# Patient Record
Sex: FEMALE | Race: ASIAN | NOT HISPANIC OR LATINO | Employment: UNEMPLOYED | URBAN - METROPOLITAN AREA
[De-identification: names, ages, dates, MRNs, and addresses within clinical notes are randomized per-mention and may not be internally consistent; named-entity substitution may affect disease eponyms.]

---

## 2018-12-13 ENCOUNTER — HOSPITAL ENCOUNTER (EMERGENCY)
Facility: MEDICAL CENTER | Age: 38
End: 2018-12-14
Attending: EMERGENCY MEDICINE
Payer: COMMERCIAL

## 2018-12-13 DIAGNOSIS — M54.2 NECK PAIN: ICD-10-CM

## 2018-12-13 PROCEDURE — 99285 EMERGENCY DEPT VISIT HI MDM: CPT

## 2018-12-13 ASSESSMENT — PAIN SCALES - GENERAL: PAINLEVEL_OUTOF10: 9

## 2018-12-14 ENCOUNTER — TELEPHONE (OUTPATIENT)
Dept: SCHEDULING | Facility: IMAGING CENTER | Age: 38
End: 2018-12-14

## 2018-12-14 ENCOUNTER — APPOINTMENT (OUTPATIENT)
Dept: RADIOLOGY | Facility: MEDICAL CENTER | Age: 38
End: 2018-12-14
Attending: EMERGENCY MEDICINE
Payer: COMMERCIAL

## 2018-12-14 VITALS
HEART RATE: 88 BPM | WEIGHT: 108 LBS | OXYGEN SATURATION: 94 % | RESPIRATION RATE: 16 BRPM | BODY MASS INDEX: 17.99 KG/M2 | TEMPERATURE: 97.7 F | HEIGHT: 65 IN

## 2018-12-14 PROCEDURE — 96375 TX/PRO/DX INJ NEW DRUG ADDON: CPT

## 2018-12-14 PROCEDURE — 96376 TX/PRO/DX INJ SAME DRUG ADON: CPT

## 2018-12-14 PROCEDURE — 96374 THER/PROPH/DIAG INJ IV PUSH: CPT

## 2018-12-14 PROCEDURE — 700111 HCHG RX REV CODE 636 W/ 250 OVERRIDE (IP): Performed by: EMERGENCY MEDICINE

## 2018-12-14 PROCEDURE — 72040 X-RAY EXAM NECK SPINE 2-3 VW: CPT

## 2018-12-14 PROCEDURE — 36415 COLL VENOUS BLD VENIPUNCTURE: CPT

## 2018-12-14 RX ORDER — ONDANSETRON 2 MG/ML
4 INJECTION INTRAMUSCULAR; INTRAVENOUS ONCE
Status: COMPLETED | OUTPATIENT
Start: 2018-12-14 | End: 2018-12-14

## 2018-12-14 RX ORDER — LANSOPRAZOLE 30 MG/1
30 CAPSULE, DELAYED RELEASE ORAL DAILY
COMMUNITY

## 2018-12-14 RX ORDER — LORAZEPAM 1 MG/1
1 TABLET ORAL EVERY 4 HOURS PRN
COMMUNITY
End: 2018-12-17

## 2018-12-14 RX ORDER — HYDROMORPHONE HYDROCHLORIDE 1 MG/ML
0.5 INJECTION, SOLUTION INTRAMUSCULAR; INTRAVENOUS; SUBCUTANEOUS ONCE
Status: COMPLETED | OUTPATIENT
Start: 2018-12-14 | End: 2018-12-14

## 2018-12-14 RX ORDER — HYDROXYCHLOROQUINE SULFATE 200 MG/1
200 TABLET, FILM COATED ORAL DAILY
COMMUNITY

## 2018-12-14 RX ORDER — AMITRIPTYLINE HYDROCHLORIDE 10 MG/1
10 TABLET, FILM COATED ORAL NIGHTLY
COMMUNITY
End: 2018-12-17

## 2018-12-14 RX ORDER — LORAZEPAM 2 MG/ML
0.5 INJECTION INTRAMUSCULAR ONCE
Status: COMPLETED | OUTPATIENT
Start: 2018-12-14 | End: 2018-12-14

## 2018-12-14 RX ORDER — METOCLOPRAMIDE 10 MG/1
10 TABLET ORAL 4 TIMES DAILY
COMMUNITY
End: 2019-02-06 | Stop reason: SDUPTHER

## 2018-12-14 RX ADMIN — ONDANSETRON 4 MG: 2 INJECTION INTRAMUSCULAR; INTRAVENOUS at 01:34

## 2018-12-14 RX ADMIN — HYDROMORPHONE HYDROCHLORIDE 0.5 MG: 1 INJECTION, SOLUTION INTRAMUSCULAR; INTRAVENOUS; SUBCUTANEOUS at 00:48

## 2018-12-14 RX ADMIN — LORAZEPAM 0.5 MG: 2 INJECTION INTRAMUSCULAR at 00:47

## 2018-12-14 ASSESSMENT — LIFESTYLE VARIABLES: DO YOU DRINK ALCOHOL: NO

## 2018-12-14 ASSESSMENT — PAIN SCALES - GENERAL: PAINLEVEL_OUTOF10: 9

## 2018-12-14 NOTE — ED TRIAGE NOTES
Chief Complaint   Patient presents with   • Neck Pain   • GLF     Pt bib remsa for GLF from bottom step of stairway at home.  Pt denies LOC, vision changes or dizziness prior to fall.  Pt states pain back of neck.  Pt states numbness bilateral feet after fall, however symptoms have resolved.  Pt states hx of mixed connective tissue disease and bulging disk on right side.  Pt given 200 fentanyl, .5 mg versed and 4 mg zofran in transport.  Pt presents to ED in cervical collar.

## 2018-12-14 NOTE — ED PROVIDER NOTES
"ED Provider Note    CHIEF COMPLAINT  Chief Complaint   Patient presents with   • Neck Pain   • GLF       HPI  Fuentes Sherman is a 38 y.o. female who presents For evaluation after slipping down stairs at home.  She states she was coming down carpeted stairs, of which there are approximately 17, when her feet slipped out and she slid down a few of them on her bottom.  She notes that she was going downstairs to get her nightly edibles due to her insomnia from fibromyalgia.  She states she reached out for the railing and somehow \"twisted my neck\" at which point she heard a \"pop\" and had an immediate increase in the pain on the posterior lateral portion of her right neck.  Patient notes she deals with chronic pain but has only recently moved to [place] last Sunday and has no resources here.  She notes she has a bulging disc on the right and chronic radiculopathy into her right arm consisting of tingling.    REVIEW OF SYSTEMS  Constitutional: No fevers or chills  Skin: No rashes, abrasions, lacerations, or bruising  HEENT: No ear pain, ringing in ears, or decreased hearing. No sore throat, runny nose, sores, trouble swallowing, trouble speaking.  Neck: Acute on chronic neck pain, right-sided worse than left.  Chronic stiffness.  No masses  Chest: No pain or rashes  Pulm: No shortness of breath, cough, wheezing, stridor, or pain with inspiration/expiration  Gastrointestinal: No nausea, vomiting, diarrhea, constipation, bloating, melena, hematochezia or pain.  Genitourinary: No dysuria or hematuria  Musculoskeletal: No weakness, swelling, or deformities.  Patient notes she hurts \"everywhere\"  Neurologic: No motor changes. No confusion or disorientation.  Heme: No bleeding or bruising problems.   Immuno: No hx of recurrent infections      PAST MEDICAL HISTORY   has a past medical history of Mixed connective tissue disease (HCC).    SOCIAL HISTORY  Social History     Social History Main Topics   • Smoking status: Never Smoker   • " "Smokeless tobacco: Never Used   • Alcohol use Not on file   • Drug use: Yes     Types: Oral      Comment: marijuana   • Sexual activity: Not on file       SURGICAL HISTORY  patient denies any surgical history    CURRENT MEDICATIONS  Home Medications     Reviewed by Conor Salazar R.N. (Registered Nurse) on 12/14/18 at 0000  Med List Status: Not Addressed   Medication Last Dose Status   amitriptyline (ELAVIL) 10 MG Tab  Active   hydroxychloroquine (PLAQUENIL) 200 MG Tab  Active   lansoprazole (PREVACID) 30 MG CAPSULE DELAYED RELEASE  Active   LORazepam (ATIVAN) 1 MG Tab  Active   metoclopramide (REGLAN) 10 MG Tab  Active                ALLERGIES  Allergies   Allergen Reactions   • Ephedrine    • Morphine      Vomiting         PHYSICAL EXAM  VITAL SIGNS: Pulse 88   Temp 36.5 °C (97.7 °F) (Temporal)   Resp 16   Ht 1.651 m (5' 5\")   Wt 49 kg (108 lb)   SpO2 94%   BMI 17.97 kg/m²    Gen: Alert, lying in semi-pyle's position c-collar in place.  Patient tearful but otherwise cooperative  HEENT: No signs of trauma, Bilateral external ears normal, Nose normal. Conjunctiva normal, Non-icteric.   Neck: Diffuse neck tenderness, both anterior, posteriorly, and laterally.  She has no palpable masses, spinous process step-offs or deformities, and neck is otherwise supple.  C-collar replaced after exam.  Patient had was stabilized and not move throughout this exam.  Lymphatic: No cervical lymphadenopathy noted.   Cardiovascular: Regular rate and rhythm, no murmurs.   Thorax & Lungs: Normal breath sounds, No respiratory distress, No wheezing bilateral chest rise  Abdomen: Bowel sounds normal, Soft, No tenderness, No masses, No pulsatile masses. No Guarding or rebound  Skin: Warm, Dry, No erythema, No rash.   Back: No bony tenderness, No CVA tenderness.   Extremities: Intact distal pulses, No edema.   Neurologic: Alert , no facial droop, grossly normal coordination and strength.  Sensation intact to light touch to all " extremities.  Psychiatric: Affect normal, Judgment normal, Mood normal.       INITIAL IMPRESSION  Patient arrives for evaluation after minor trauma appears to have caused an acute exacerbation in her chronic pain.  Her chronic pain is extensive but centers around a radiculopathy involving the cervical spine and the right arm.  She has no new findings to suggest any significant muscular weakness and has no external findings to suggest significant trauma.  She is very anxious and will be given a dose of Ativan as well as a small dose of Dilaudid as she states this is the only thing that helps her.    LABS  No results found for this or any previous visit.    RADIOLOGY  DX-CERVICAL SPINE-2 OR 3 VIEWS   Final Result      No acute fracture or listhesis in the cervical spine.          Reevaluation   Time:2:07 AM  Vital signs: Noted per nursing note, appears stable  Assessment: Patient calm, no longer anxious appearing.  Does not appear overly sedated but does appear tired.  States the pain is improved     COURSE & MEDICAL DECISION MAKING  Pertinent Labs & Imaging studies reviewed. (See chart for details)  Patient arrives for evaluation of neck pain which does not appear to be related to any acute or emergent trauma.  I did not feel further imaging or laboratory evaluation was necessary given the patient's very mild mechanism of injury.  It is clear that she will need multiple specialty follow-up for her chronic problems but I do not feel that needs to happen emergently.  Patient had no significant neurologic deficits or deterioration through out her ED stay and I felt she was safe for discharge.  She will continue her outpatient pain medication regimen.    FINAL IMPRESSION  1. Neck pain        Electronically signed by: Emiliano Rodriguez, 12/14/2018 12:17 AM

## 2018-12-16 ENCOUNTER — OFFICE VISIT (OUTPATIENT)
Dept: URGENT CARE | Facility: CLINIC | Age: 38
End: 2018-12-16
Payer: COMMERCIAL

## 2018-12-16 ENCOUNTER — APPOINTMENT (OUTPATIENT)
Dept: RADIOLOGY | Facility: IMAGING CENTER | Age: 38
End: 2018-12-16
Attending: FAMILY MEDICINE
Payer: COMMERCIAL

## 2018-12-16 VITALS
BODY MASS INDEX: 19.14 KG/M2 | TEMPERATURE: 99 F | DIASTOLIC BLOOD PRESSURE: 66 MMHG | OXYGEN SATURATION: 100 % | WEIGHT: 108 LBS | HEIGHT: 63 IN | HEART RATE: 89 BPM | SYSTOLIC BLOOD PRESSURE: 104 MMHG

## 2018-12-16 DIAGNOSIS — M54.9 DORSALGIA: ICD-10-CM

## 2018-12-16 DIAGNOSIS — M35.1 MIXED CONNECTIVE TISSUE DISEASE (HCC): ICD-10-CM
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
## 2018-12-16 DIAGNOSIS — R07.81 RIB PAIN: ICD-10-CM

## 2018-12-16 PROCEDURE — 71046 X-RAY EXAM CHEST 2 VIEWS: CPT | Mod: 26 | Performed by: FAMILY MEDICINE

## 2018-12-16 PROCEDURE — 99214 OFFICE O/P EST MOD 30 MIN: CPT | Performed by: FAMILY MEDICINE

## 2018-12-16 PROCEDURE — 72100 X-RAY EXAM L-S SPINE 2/3 VWS: CPT | Mod: 26 | Performed by: FAMILY MEDICINE

## 2018-12-16 PROCEDURE — 72072 X-RAY EXAM THORAC SPINE 3VWS: CPT | Mod: 26 | Performed by: FAMILY MEDICINE

## 2018-12-16 RX ORDER — HYDROCODONE BITARTRATE AND ACETAMINOPHEN 5; 325 MG/1; MG/1
1 TABLET ORAL EVERY 6 HOURS PRN
Qty: 30 TAB | Refills: 0 | Status: SHIPPED | OUTPATIENT
Start: 2018-12-16 | End: 2018-12-26

## 2018-12-16 ASSESSMENT — ENCOUNTER SYMPTOMS
DIZZINESS: 0
FOCAL WEAKNESS: 0
FALLS: 1
SHORTNESS OF BREATH: 0
FEVER: 0
CHILLS: 0
NECK PAIN: 1
ORTHOPNEA: 0
BACK PAIN: 1
MYALGIAS: 1
HEMOPTYSIS: 0

## 2018-12-16 NOTE — PROGRESS NOTES
Subjective:      Fuentes Sherman is a 38 y.o. female who presents with Neck Pain (Hx of bulging vertebral disc. Pt fell down stairs and injured neck on 12/13. Neck pain, back pain, tingling in fingers, pain when breathing.)      - This is a very pleasant, well and non-toxic appearing 38 y.o. female with complaints of slip/fall down flight of carpeted steps at home (~17 steps) on 12/13. Has pain to entire back/ribs and neck. Radiates down Lt arm some. No new limb weakness, numbness heaviness or HA's. Has Hx of mixed connective tissue Dz and fibro. Was seen specialists in [place] before moving here 2 wks ago, has no PCP here.           ALLERGIES:  Ephedrine and Morphine     PMH:  Past Medical History:   Diagnosis Date   • Mixed connective tissue disease (HCC)         MEDS:    Current Outpatient Prescriptions:   •  vitamin D (CHOLECALCIFEROL) 1000 UNIT Tab, Take 1,000 Units by mouth every day., Disp: , Rfl:   •  HYDROcodone-acetaminophen (NORCO) 5-325 MG Tab per tablet, Take 1 Tab by mouth every 6 hours as needed for up to 10 days., Disp: 30 Tab, Rfl: 0  •  LORazepam (ATIVAN) 1 MG Tab, Take 1 mg by mouth every four hours as needed for Anxiety., Disp: , Rfl:   •  amitriptyline (ELAVIL) 10 MG Tab, Take 10 mg by mouth every evening., Disp: , Rfl:   •  hydroxychloroquine (PLAQUENIL) 200 MG Tab, Take 400 mg by mouth every day., Disp: , Rfl:   •  metoclopramide (REGLAN) 10 MG Tab, Take 10 mg by mouth 4 times a day., Disp: , Rfl:   •  lansoprazole (PREVACID) 30 MG CAPSULE DELAYED RELEASE, Take 30 mg by mouth every day., Disp: , Rfl:     ** I have documented what I find to be significant in regards to past medical, social, family and surgical history  in my HPI or under PMH/PSH/FH review section, otherwise it is contributory **           HPI    Review of Systems   Constitutional: Negative for chills and fever.   Respiratory: Negative for hemoptysis and shortness of breath.    Cardiovascular: Negative for chest pain ( ribs) and  "orthopnea.   Musculoskeletal: Positive for back pain, falls, joint pain, myalgias and neck pain.   Neurological: Negative for dizziness and focal weakness.          Objective:     /66   Pulse 89   Temp 37.2 °C (99 °F)   Ht 1.6 m (5' 3\")   Wt 49 kg (108 lb)   SpO2 100%   BMI 19.13 kg/m²      Physical Exam   Constitutional: She appears well-developed. No distress.   HENT:   Head: Normocephalic and atraumatic.   Mouth/Throat: Oropharynx is clear and moist.   Eyes: Conjunctivae are normal.   Neck: Neck supple.   Cardiovascular: Regular rhythm.    No murmur heard.  Pulmonary/Chest: Effort normal and breath sounds normal. No respiratory distress.   Musculoskeletal:        Arms:  Neurological: She is alert. She exhibits normal muscle tone.   Skin: Skin is warm and dry.   Psychiatric: She has a normal mood and affect. Judgment normal.   Nursing note and vitals reviewed.  Back/Neck w/ TTP  No gross weakness of limbs appreciated w/ ROM. + 2 patella reflex             Assessment/Plan:         1. Fall down stairs, initial encounter  DX-CHEST-2 VIEWS    REFERRAL TO PHYSIATRY (PMR)    Consent for Opiate Prescription   2. Dorsalgia  DX-THORACIC SPINE-WITH SWIMMERS VIEW    DX-LUMBAR SPINE-2 OR 3 VIEWS    DX-CHEST-2 VIEWS    REFERRAL TO PHYSIATRY (PMR)    HYDROcodone-acetaminophen (NORCO) 5-325 MG Tab per tablet    Consent for Opiate Prescription   3. Rib pain  DX-CHEST-2 VIEWS    REFERRAL TO PHYSIATRY (PMR)    HYDROcodone-acetaminophen (NORCO) 5-325 MG Tab per tablet    Consent for Opiate Prescription   4. Mixed connective tissue disease (HCC)  REFERRAL TO PHYSIATRY (PMR)    REFERRAL TO RHEUMATOLOGY             Dx & d/c instructions discussed w/ patient and/or family members.     ER precautions (worsening signs symptoms and when to go to ER) discussed.    Follow up w/ PCP in 2-3 days to make sure symptoms improving and no further intervention/treatment and/or work-up needed was advised, ER if feeling worse or not " improving in 2 days.    Possible side effects (i.e. Rash, GI upset/constipation, sedation, elevation of BP or sugars) of any medications given discussed.     Patient left in stable condition

## 2018-12-17 ENCOUNTER — OFFICE VISIT (OUTPATIENT)
Dept: MEDICAL GROUP | Age: 38
End: 2018-12-17
Payer: COMMERCIAL

## 2018-12-17 VITALS
TEMPERATURE: 98.1 F | HEIGHT: 63 IN | SYSTOLIC BLOOD PRESSURE: 118 MMHG | OXYGEN SATURATION: 97 % | BODY MASS INDEX: 19 KG/M2 | HEART RATE: 82 BPM | DIASTOLIC BLOOD PRESSURE: 80 MMHG | WEIGHT: 107.2 LBS

## 2018-12-17 DIAGNOSIS — M79.7 FIBROMYALGIA: Primary | ICD-10-CM

## 2018-12-17 DIAGNOSIS — M54.2 CERVICALGIA: ICD-10-CM

## 2018-12-17 DIAGNOSIS — Z00.00 PE (PHYSICAL EXAM), ANNUAL: ICD-10-CM

## 2018-12-17 DIAGNOSIS — K31.84 GASTROPARESIS: ICD-10-CM

## 2018-12-17 DIAGNOSIS — R49.0 DYSPHONIA: ICD-10-CM

## 2018-12-17 DIAGNOSIS — F12.90 MARIJUANA USE: ICD-10-CM

## 2018-12-17 DIAGNOSIS — F51.01 PRIMARY INSOMNIA: ICD-10-CM

## 2018-12-17 DIAGNOSIS — M35.1 MIXED CONNECTIVE TISSUE DISEASE (HCC): ICD-10-CM

## 2018-12-17 DIAGNOSIS — G89.4 CHRONIC PAIN SYNDROME: ICD-10-CM

## 2018-12-17 PROBLEM — M50.90 CERVICAL DISC DISEASE: Status: ACTIVE | Noted: 2018-12-17

## 2018-12-17 PROCEDURE — 99215 OFFICE O/P EST HI 40 MIN: CPT | Performed by: FAMILY MEDICINE

## 2018-12-17 RX ORDER — DULOXETIN HYDROCHLORIDE 20 MG/1
20 CAPSULE, DELAYED RELEASE ORAL DAILY
Qty: 90 CAP | Refills: 1 | Status: SHIPPED | OUTPATIENT
Start: 2018-12-17 | End: 2019-02-06

## 2018-12-17 RX ORDER — AMITRIPTYLINE HYDROCHLORIDE 50 MG/1
50 TABLET, FILM COATED ORAL NIGHTLY PRN
Qty: 90 TAB | Refills: 1 | Status: SHIPPED | OUTPATIENT
Start: 2018-12-17

## 2018-12-17 RX ORDER — CYCLOBENZAPRINE HCL 5 MG
5 TABLET ORAL 3 TIMES DAILY
Qty: 270 TAB | Refills: 0 | Status: SHIPPED | OUTPATIENT
Start: 2018-12-17 | End: 2019-02-06

## 2018-12-17 ASSESSMENT — PATIENT HEALTH QUESTIONNAIRE - PHQ9: CLINICAL INTERPRETATION OF PHQ2 SCORE: 0

## 2018-12-17 NOTE — PROGRESS NOTES
"CC: Establish care    HPI:     Fuentes Sherman is a 38 y.o. female, new patient to the clinic and would like to establish care. The patient was previously established with [facility], PCP Dr. Segundo, in [facility].     The patient has a surgical history of tonsillectomy.   The patient's mother has history of hypertension and osteoporosis. and father has a history of hypertension and Diabetes. Paternal aunt and uncle have history of cancer (unknown). The patient was a former 0.5 ppd smoker for 10 years, quit in . Denies alcohol use. Patient uses marijuana at night for sleep. Denies other illicit drug use. Patient has an obstetric history of , with two normal living children. The patient had post partum bleeding with 1st pregnancy needing exploratory surgery following delivery.  She also reports chronic bleeding during 2nd pregnancy.  Both pregnancies was monitored by specialist.    The patient presents with the following medical concerns:     Cervicalgia:   Patient with history of cervical spine disease since 2017 after a fall in [place].  She was told that she has cervical disc disease with previous MRI.  She complains of chronic, constant right sided neck pain, exacerbated with tilting head backwards. Patient was treated with 2x epidural injections with significant relief for 24 hours. The patient was referred to pain management clinic, who was treating patient with electromyopulse therapy. The patient had another fall on 18, and was seen in the ED complaining of right neck pain with \"shooting\" pain in her bilateral upper extremities. The patient states she cannot tolerate NSAID's secondary to gastritis. The patient has treated her neck pain with Norco 5-325 mg with significant relief. Chest X Ray, L Spine X Ray, T Spine X Ray, and C Spine X Ray yesterday after her fall were all negative for acute fractures.  Patient is interested in referral to neurosurgeon as well as pain management for chronic neck " pain as well as myofascial pain associated with fibromyalgia as well as mixed connective tissue disease.    Dysphonia:   The patient complains of chronic dysphonia. Patient was seen by ENT in [place], [place], who thought symptoms were from acid reflux. Patient is currently taking proton pump inhibitor as well as Reglan.  Patient also had an EGD done approximately 2 months ago in [place].  Patient states that she was told that she has gastritis.  Patient requests referral to gastroenterologist for consultation.  Patient tolerates PPI well, denies any side effect.    Gastroparesis:   The patient had 20 lbs of unexplained weight loss in 2 weeks, and was seen by ENT with EGD/colonoscopy in 10/2018 showing gastritis.  Patient was diagnosed with gastroparesis. Patient has been taking Reglan, Prevacid for the last 2 months. She denies any medication side effects.     Fibromyalgia:   Mixed Connective Tissue Disease:   Patient was seen by Rheumatologist in [place], and has tried Gabapentin and Lyrica in the past with poor management of symptoms.  Patient was also diagnosed with mixed connective tissue disease by Dr. Yadav, rheumatologist in [place].  Her symptoms are under good control with Plaquenil for the past 4 years.  Plaquenil was recently reduced to 200 mg daily due to ophthalmologic complication.  She denies any changes in vision at this time.  However, she complains of worsening whole body pain.  She was recently prescribed Norco 5-325 mg by urgent Care has helped with her MCTD pain the last two days.     Primary Insomnia:   The patient uses marijuana regularly at night for difficulty sleeping. The patient also takes PRN Ativan a few times per week, which she alternates with Amitriptyline. The patient states her difficulty sleeping has been worse recently since moving to [place].     Current medicines (including changes today)  Current Outpatient Prescriptions   Medication Sig Dispense Refill   • DULoxetine  (CYMBALTA) 20 MG Cap DR Particles Take 1 Cap by mouth every day. 90 Cap 1   • cyclobenzaprine (FLEXERIL) 5 MG tablet Take 1 Tab by mouth 3 times a day. 270 Tab 0   • amitriptyline (ELAVIL) 50 MG Tab Take 1 Tab by mouth at bedtime as needed. 90 Tab 1   • vitamin D (CHOLECALCIFEROL) 1000 UNIT Tab Take 1,000 Units by mouth every day.     • HYDROcodone-acetaminophen (NORCO) 5-325 MG Tab per tablet Take 1 Tab by mouth every 6 hours as needed for up to 10 days. 30 Tab 0   • hydroxychloroquine (PLAQUENIL) 200 MG Tab Take 200 mg by mouth every day.     • metoclopramide (REGLAN) 10 MG Tab Take 10 mg by mouth 4 times a day.     • lansoprazole (PREVACID) 30 MG CAPSULE DELAYED RELEASE Take 30 mg by mouth every day.       No current facility-administered medications for this visit.      She  has a past medical history of Mixed connective tissue disease (HCC).  She  has a past surgical history that includes tonsillectomy and abdominal exploration.  Social History   Substance Use Topics   • Smoking status: Former Smoker     Packs/day: 0.75     Years: 10.00     Quit date: 1990   • Smokeless tobacco: Never Used   • Alcohol use No     Social History     Social History Narrative   • No narrative on file     Family History   Problem Relation Age of Onset   • Hypertension Mother    • Osteoporosis Mother    • Hypertension Father    • Diabetes Father    • No Known Problems Brother    • Cancer Maternal Uncle         ?   • Cancer Paternal Aunt         ?   • Cancer Paternal Uncle         ?   • Osteoporosis Maternal Grandmother    • Alzheimer's Disease Paternal Grandmother    • Cancer Paternal Grandfather         ?     No family status information on file.       I personally reviewed patient's problem list, allergies, medications, family hx, social hx with patient and update [facility].     REVIEW OF SYSTEMS:  CONSTITUTIONAL:  Denies night sweats, fatigue, malaise, lethargy, fever or chills.  RESPIRATORY:  Denies cough, wheeze, hemoptysis, or  "shortness of breath.  CARDIOVASCULAR:  Denies chest pains, palpitations, pedal edema  GASTROINTESTINAL:  Denies abdominal pain, nausea or vomiting, diarrhea, constipation, hematemesis, hematochezia, melena.  GENITOURINARY:  Denies urinary urgency, frequency, dysuria, or hematuria.  No obstructive symptoms.  Denies unusual discharge.    All other systems reviewed and are negative     Objective:     Blood pressure 118/80, pulse 82, temperature 36.7 °C (98.1 °F), temperature source Temporal, height 1.6 m (5' 3\"), weight 48.6 kg (107 lb 3.2 oz), last menstrual period 12/15/2018, SpO2 97 %, not currently breastfeeding. Body mass index is 18.99 kg/m².  Physical Exam:    Constitutional: Awake, alert, in no apparent distress.  Skin: Warm, dry, good turgor, no rashes/jaundice in visible areas.  Eye: PERRL, intact EOM, conjunctiva clear, lids normal.  ENMT: TM and auditory canal wnl, nasal & oral mucosa wnl, lips without lesions, good dentition, oropharynx clear.  Neck: Trachea midline, no masses, no thyromegaly. No cervical or supraclavicular lymphadenopathy.  Respiratory: Unlabored respiratory effort, lungs clear to auscultation, no wheezes, no rales.  Cardiovascular: Normal S1, S2, no murmur, no rubs, no gallops, no pedal edema.  Abdomen: Soft, active bowel sounds, non-tender to palpation, no masses, no hepatosplenomegaly, no rebound or guarding.    Assessment and Plan:   The following treatment plan was discussed    1. Mixed connective tissue disease (HCC)  2. Fibromyalgia  3. Chronic pain syndrome  Patient suffers from chronic pain syndrome secondary to fibromyalgia and mixed connective tissue disease diagnosed in [place], patient previously managed with Plaquenil by rheumatology.  Patient recently moved to [place] and requests referral to rheumatology to  care.  Patient is currently taking Plaquenil 200 mg daily.  The dosage of Plaquenil was recently reduced from 400 mg daily to 200 mg daily due to ophthalmologic " complication.  Patient was advised to follow-up with ophthalmology in [place].  Patient is currently taking may marijuana for pain.  She was also recently prescribed a course of Bee by urgent care to manage her symptoms.  Patient is unable to tolerate NSAIDs due to gastritis.  Tylenol is ineffective.  Plans:  - DULoxetine (CYMBALTA) 20 MG Cap DR Particles; Take 1 Cap by mouth every day.  Dispense: 90 Cap; Refill: 1  - cyclobenzaprine (FLEXERIL) 5 MG tablet; Take 1 Tab by mouth 3 times a day.  Dispense: 270 Tab; Refill: 0  - Continue Plaquenil 200 mg daily  - REFERRAL TO RHEUMATOLOGY for management of mixed connective tissue disease.  - REFERRAL TO PAIN MANAGEMENT for chronic pain syndrome  - REFERRAL TO OPHTHALMOLOGY for routine monitoring of eye diseases while taking Plaquenil.  -Recommended acupuncture and referral to counseling for chronic pain.  Patient states that she will look into that later on.    4. Gastroparesis  Patient was recently diagnosed with gastroparesis by GI in [place].  Patient is currently taking PPI and Reglan.  Her symptoms are in relatively good control.  Last EGD and colonoscopy was approximately 2 months ago.  Per patient, except for gastritis, EGD and colonoscopy were unremarkable.  Plans:  - REFERRAL TO GASTROENTEROLOGY  - Continue Reglan and PPI    5. Cervicalgia  Chronic, secondary to cervical spine trauma after history of fall with neck injury in [place] last year.  However, patient again suffered another fall couple days ago and was seen by ER in urgent care.  She is currently taking Norco and marijuana for pain.  She is unable to tolerate NSAIDs due to history of gastritis and gastroparesis.  Tylenol is ineffective.  Today, patient complains of paresthesia affecting both upper and lower extremity.  She denies bowel/bladder symptoms or upper/lower extremity weakness.  Patient requests to be referred to neurosurgeon.  Plans:  - REFERRAL TO NEUROSURGERY  - Deferred imaging study  to neurosurgery, likely needs MRI of the cervical spine.  - Continue Norco as needed for pain.  Discussed risks associated with Norco.  Patient was advised to avoid alcohol, illicit drug, sleeping aid, benzodiazepine while taking Norco.    6. Dysphonia  Chronic, thought to be secondary to gastric reflux.  Patient is on PPI.  She was referred to GI for consultation as above.    7. Primary insomnia  Chronic, secondary to chronic pain from fibromyalgia and mixed connective tissue disease.  Patient is taking amitriptyline, marijuana, and Ativan as needed for sleep.  Plans:  -I discussed health complications associated with chronic benzodiazepine consumption.  Recommended cessation of Ativan.  Patient agrees to plans.  - amitriptyline (ELAVIL) 50 MG Tab; Take 1 Tab by mouth at bedtime as needed.  Dispense: 90 Tab; Refill: 1  -Continue medical marijuana  - Melatonin nightly   - regular exercise, well-balanced diet, multi-vitamin daily, meditation, stress reduction.   - Avoid excessive consumption of stimulants, alcohol, illicit drugs, tobacco  - Avoid using computer or electronic devices at night  - Avoid watching TV on bed  - Avoid napping during the day  - f/u PRN.      8. Marijuana use  Patient is using medical marijuana daily for sleep, mood, pain.  She denies any social or occupational problems with chronic Marijuana consumption.  Will continue routine monitoring.    Patient was seen for 40 minutes face to face of which greater than 50% of appointment time was spent on counseling and coordination of care regarding the above     Ly DAMARI Gu M.D.    Records requested.  Followup: Return for Pap.    Please note that this dictation was created using voice recognition software and/or scribes. I have made every reasonable attempt to correct obvious errors, but I expect that there are errors of grammar and possibly content that I did not discover before finalizing the note.     I, Ben Rodriguez (Scribe), am scribing for, and in  the presence ofLashell M.D.    Electronically signed by: Ben Rodriguez (Scribe), 12/17/2018    ILashell M.D. personally performed the services described in this documentation, as scribed by Ben Rodriguez in my presence, and it is both accurate and complete.

## 2018-12-21 ENCOUNTER — TELEPHONE (OUTPATIENT)
Dept: MEDICAL GROUP | Age: 38
End: 2018-12-21

## 2018-12-28 ENCOUNTER — HOSPITAL ENCOUNTER (OUTPATIENT)
Dept: LAB | Facility: MEDICAL CENTER | Age: 38
End: 2018-12-28
Attending: FAMILY MEDICINE
Payer: COMMERCIAL

## 2018-12-28 DIAGNOSIS — Z00.00 PE (PHYSICAL EXAM), ANNUAL: ICD-10-CM

## 2018-12-28 LAB
ALBUMIN SERPL BCP-MCNC: 4.4 G/DL (ref 3.2–4.9)
ALBUMIN/GLOB SERPL: 1.8 G/DL
ALP SERPL-CCNC: 40 U/L (ref 30–99)
ALT SERPL-CCNC: 12 U/L (ref 2–50)
ANION GAP SERPL CALC-SCNC: 7 MMOL/L (ref 0–11.9)
AST SERPL-CCNC: 16 U/L (ref 12–45)
BASOPHILS # BLD AUTO: 1.5 % (ref 0–1.8)
BASOPHILS # BLD: 0.07 K/UL (ref 0–0.12)
BILIRUB SERPL-MCNC: 0.6 MG/DL (ref 0.1–1.5)
BUN SERPL-MCNC: 10 MG/DL (ref 8–22)
CALCIUM SERPL-MCNC: 9.3 MG/DL (ref 8.5–10.5)
CHLORIDE SERPL-SCNC: 104 MMOL/L (ref 96–112)
CHOLEST SERPL-MCNC: 182 MG/DL (ref 100–199)
CO2 SERPL-SCNC: 28 MMOL/L (ref 20–33)
CREAT SERPL-MCNC: 0.67 MG/DL (ref 0.5–1.4)
EOSINOPHIL # BLD AUTO: 0.23 K/UL (ref 0–0.51)
EOSINOPHIL NFR BLD: 4.8 % (ref 0–6.9)
ERYTHROCYTE [DISTWIDTH] IN BLOOD BY AUTOMATED COUNT: 43.9 FL (ref 35.9–50)
EST. AVERAGE GLUCOSE BLD GHB EST-MCNC: 111 MG/DL
FASTING STATUS PATIENT QL REPORTED: NORMAL
GLOBULIN SER CALC-MCNC: 2.5 G/DL (ref 1.9–3.5)
GLUCOSE SERPL-MCNC: 78 MG/DL (ref 65–99)
HBA1C MFR BLD: 5.5 % (ref 0–5.6)
HCT VFR BLD AUTO: 41.8 % (ref 37–47)
HDLC SERPL-MCNC: 70 MG/DL
HGB BLD-MCNC: 13.3 G/DL (ref 12–16)
IMM GRANULOCYTES # BLD AUTO: 0.01 K/UL (ref 0–0.11)
IMM GRANULOCYTES NFR BLD AUTO: 0.2 % (ref 0–0.9)
LDLC SERPL CALC-MCNC: 96 MG/DL
LYMPHOCYTES # BLD AUTO: 1.68 K/UL (ref 1–4.8)
LYMPHOCYTES NFR BLD: 34.9 % (ref 22–41)
MCH RBC QN AUTO: 29.5 PG (ref 27–33)
MCHC RBC AUTO-ENTMCNC: 31.8 G/DL (ref 33.6–35)
MCV RBC AUTO: 92.7 FL (ref 81.4–97.8)
MONOCYTES # BLD AUTO: 0.25 K/UL (ref 0–0.85)
MONOCYTES NFR BLD AUTO: 5.2 % (ref 0–13.4)
NEUTROPHILS # BLD AUTO: 2.57 K/UL (ref 2–7.15)
NEUTROPHILS NFR BLD: 53.4 % (ref 44–72)
NRBC # BLD AUTO: 0 K/UL
NRBC BLD-RTO: 0 /100 WBC
PLATELET # BLD AUTO: 483 K/UL (ref 164–446)
PMV BLD AUTO: 9.1 FL (ref 9–12.9)
POTASSIUM SERPL-SCNC: 3.6 MMOL/L (ref 3.6–5.5)
PROT SERPL-MCNC: 6.9 G/DL (ref 6–8.2)
RBC # BLD AUTO: 4.51 M/UL (ref 4.2–5.4)
SODIUM SERPL-SCNC: 139 MMOL/L (ref 135–145)
TRIGL SERPL-MCNC: 82 MG/DL (ref 0–149)
WBC # BLD AUTO: 4.8 K/UL (ref 4.8–10.8)

## 2018-12-28 PROCEDURE — 85025 COMPLETE CBC W/AUTO DIFF WBC: CPT

## 2018-12-28 PROCEDURE — 82306 VITAMIN D 25 HYDROXY: CPT

## 2018-12-28 PROCEDURE — 83036 HEMOGLOBIN GLYCOSYLATED A1C: CPT

## 2018-12-28 PROCEDURE — 80053 COMPREHEN METABOLIC PANEL: CPT

## 2018-12-28 PROCEDURE — 36415 COLL VENOUS BLD VENIPUNCTURE: CPT

## 2018-12-28 PROCEDURE — 80061 LIPID PANEL: CPT

## 2018-12-28 PROCEDURE — 84443 ASSAY THYROID STIM HORMONE: CPT

## 2018-12-29 LAB
25(OH)D3 SERPL-MCNC: 31 NG/ML (ref 30–100)
TSH SERPL DL<=0.005 MIU/L-ACNC: 0.67 UIU/ML (ref 0.38–5.33)

## 2018-12-31 DIAGNOSIS — D75.839 THROMBOCYTOSIS: ICD-10-CM

## 2019-01-07 ENCOUNTER — TELEPHONE (OUTPATIENT)
Dept: MEDICAL GROUP | Age: 39
End: 2019-01-07

## 2019-01-07 DIAGNOSIS — M35.1 MIXED CONNECTIVE TISSUE DISEASE (HCC): ICD-10-CM

## 2019-01-07 NOTE — TELEPHONE ENCOUNTER
Pt was referred to Rheumatology for this condition. Please advise her to call the following number. Thanks    Rheumatology   [facility]  [address] [place] [address] [id number]  [place] [address]  Phone: [phone]  Fax: [phone]

## 2019-01-09 NOTE — TELEPHONE ENCOUNTER
Phone Number Called: [phone] (home)       Message: Called pt and let her know of message below.     Left Message for patient to call back: N\A

## 2019-01-16 ENCOUNTER — HOSPITAL ENCOUNTER (OUTPATIENT)
Dept: RADIOLOGY | Facility: MEDICAL CENTER | Age: 39
End: 2019-01-16

## 2019-01-21 ENCOUNTER — HOSPITAL ENCOUNTER (OUTPATIENT)
Dept: RADIOLOGY | Facility: MEDICAL CENTER | Age: 39
End: 2019-01-21
Attending: PHYSICAL MEDICINE & REHABILITATION
Payer: COMMERCIAL

## 2019-01-21 DIAGNOSIS — M54.12 BRACHIAL NEURITIS: ICD-10-CM

## 2019-01-21 DIAGNOSIS — M54.16 LUMBAR RADICULOPATHY: ICD-10-CM

## 2019-01-21 DIAGNOSIS — M54.14 THORACIC NEURITIS: ICD-10-CM

## 2019-01-21 PROCEDURE — 72146 MRI CHEST SPINE W/O DYE: CPT

## 2019-01-21 PROCEDURE — 72148 MRI LUMBAR SPINE W/O DYE: CPT

## 2019-01-21 PROCEDURE — 72141 MRI NECK SPINE W/O DYE: CPT

## 2019-02-06 ENCOUNTER — OFFICE VISIT (OUTPATIENT)
Dept: MEDICAL GROUP | Age: 39
End: 2019-02-06
Payer: COMMERCIAL

## 2019-02-06 VITALS
TEMPERATURE: 98.7 F | HEIGHT: 63 IN | OXYGEN SATURATION: 96 % | SYSTOLIC BLOOD PRESSURE: 106 MMHG | BODY MASS INDEX: 20.77 KG/M2 | HEART RATE: 79 BPM | WEIGHT: 117.2 LBS | DIASTOLIC BLOOD PRESSURE: 64 MMHG

## 2019-02-06 DIAGNOSIS — R49.0 DYSPHONIA: ICD-10-CM

## 2019-02-06 DIAGNOSIS — N80.00 UTERUS, ADENOMYOSIS: ICD-10-CM

## 2019-02-06 DIAGNOSIS — G89.4 CHRONIC PAIN SYNDROME: ICD-10-CM

## 2019-02-06 DIAGNOSIS — M54.2 CERVICALGIA: ICD-10-CM

## 2019-02-06 DIAGNOSIS — M79.7 FIBROMYALGIA: ICD-10-CM

## 2019-02-06 DIAGNOSIS — Z00.00 PE (PHYSICAL EXAM), ANNUAL: Primary | ICD-10-CM

## 2019-02-06 DIAGNOSIS — N63.10 BREAST MASS, RIGHT: ICD-10-CM

## 2019-02-06 DIAGNOSIS — M34.9 SCLERODERMA (HCC): ICD-10-CM

## 2019-02-06 DIAGNOSIS — F12.90 MARIJUANA USE: ICD-10-CM

## 2019-02-06 DIAGNOSIS — M35.1 MIXED CONNECTIVE TISSUE DISEASE (HCC): ICD-10-CM

## 2019-02-06 DIAGNOSIS — K31.84 GASTROPARESIS: ICD-10-CM

## 2019-02-06 DIAGNOSIS — D75.839 THROMBOCYTOSIS: ICD-10-CM

## 2019-02-06 DIAGNOSIS — F51.01 PRIMARY INSOMNIA: ICD-10-CM

## 2019-02-06 PROCEDURE — 99395 PREV VISIT EST AGE 18-39: CPT | Performed by: FAMILY MEDICINE

## 2019-02-06 PROCEDURE — 99358 PROLONG SERVICE W/O CONTACT: CPT | Performed by: FAMILY MEDICINE

## 2019-02-06 RX ORDER — ANTIARTHRITIC COMBINATION NO.2 900 MG
TABLET ORAL
COMMUNITY

## 2019-02-06 RX ORDER — METOCLOPRAMIDE 10 MG/1
10 TABLET ORAL
Qty: 90 TAB | Refills: 0 | Status: SHIPPED | OUTPATIENT
Start: 2019-02-06

## 2019-02-06 RX ORDER — LORAZEPAM 0.5 MG/1
0.5 TABLET ORAL
COMMUNITY
End: 2019-02-06 | Stop reason: SDUPTHER

## 2019-02-06 RX ORDER — OMEPRAZOLE 20 MG/1
CAPSULE, DELAYED RELEASE ORAL
COMMUNITY
Start: 2019-01-08

## 2019-02-06 RX ORDER — LORAZEPAM 0.5 MG/1
0.5 TABLET ORAL NIGHTLY PRN
Qty: 30 TAB | Refills: 0 | Status: SHIPPED | OUTPATIENT
Start: 2019-02-06 | End: 2019-03-08

## 2019-02-06 ASSESSMENT — PATIENT HEALTH QUESTIONNAIRE - PHQ9: CLINICAL INTERPRETATION OF PHQ2 SCORE: 0

## 2019-02-06 NOTE — PROGRESS NOTES
"Subjective:   CC: Annual PE    HPI:     Fuentes Sherman is a 38 y.o. female who is here as an established patient and presents with the following concerns:    History of cervical spine disease since 2017 following a fall in [place] currently [facility] and pain management. Since her last visit, patient had a MRI T-Spine, MRI L-Spine, and MRI C-Spine performed with [facility]. MRIs showed that she has a \"congenital fusion, three disc bulges, and a misaligned spine\" among other results. She currently has an appointment to follow up with pain management next week but was not able to schedule a rheumatology appointment. Patient followed up with Neurosurgery last week and was informed that surgery was a last resort option for her.  Patient has history of fibromyalgia.  At previous office visit, she was started on Cymbalta and Flexeril.  However, she was not able to tolerate Cymbalta.  She was advised by pain management not to take Flexeril.  She continues to work with pain management for issues related to her spine and chronic pains syndrome.    History of primary insomnia well controlled with amitriptyline 50 mg QHS PRN, medical marijuana, melatonin supplements, and Ativan (prescribed by her rheumatologist in [place]).  Patient was advised to take 0.5 mg of Xanax twice weekly for sleep.  Patient ran out of medication and requests refill.    History of scleroderma not currently controlled with any medications. Patient would like a referral to Dermatology at this time.    Patient was diagnosed with mixed connective tissue disease, currently taking Plaquenil 200 mg daily.  This medication was previously managed by rheumatology in [place].  Patient was referred to local rheumatology.  She has appointment to establish care with them in a few months.  She continues to tolerate Plaquenil without any problems.    Current medicines (including changes today)  Current Outpatient Prescriptions   Medication Sig " "Dispense Refill   • NON SPECIFIED      • Biotin 5000 MCG Tab      • Probiotic Product (PROBIOTIC-10) Cap      • Ascorbic Acid (VITAMIN C) 500 MG Chew Tab      • NON SPECIFIED      • omeprazole (PRILOSEC) 20 MG delayed-release capsule      • LORazepam (ATIVAN) 0.5 MG Tab Take 1 Tab by mouth at bedtime as needed for Anxiety (sleep) for up to 30 days. 30 Tab 0   • amitriptyline (ELAVIL) 50 MG Tab Take 1 Tab by mouth at bedtime as needed. 90 Tab 1   • vitamin D (CHOLECALCIFEROL) 1000 UNIT Tab Take 1,000 Units by mouth every day.     • hydroxychloroquine (PLAQUENIL) 200 MG Tab Take 200 mg by mouth every day.     • metoclopramide (REGLAN) 10 MG Tab Take 10 mg by mouth 4 times a day.     • lansoprazole (PREVACID) 30 MG CAPSULE DELAYED RELEASE Take 30 mg by mouth every day.       No current facility-administered medications for this visit.      She  has a past medical history of Mixed connective tissue disease (HCC).    I personally reviewed patient's problem list, allergies, medications, family hx, social hx with patient and update [facility].     REVIEW OF SYSTEMS:  CONSTITUTIONAL:  Denies night sweats, fatigue, malaise, lethargy, fever or chills.  RESPIRATORY:  Denies cough, wheeze, hemoptysis, or shortness of breath.  CARDIOVASCULAR:  Denies chest pains, palpitations, pedal edema     Objective:     Blood pressure 106/64, pulse 79, temperature 37.1 °C (98.7 °F), temperature source Temporal, height 1.6 m (5' 3\"), weight 53.2 kg (117 lb 3.2 oz), last menstrual period 01/18/2019, SpO2 96 %, not currently breastfeeding. Body mass index is 20.76 kg/m².    Physical Exam:  Constitutional: awake, alert, in no distress.  Skin: Warm, dry, good turgor, no rashes, bruises, ulcers in visible areas.  Eye: conjunctiva clear, lids neg for edema or lesions.  Neck: Trachea midline, no masses, no thyromegaly. No cervical or supraclavicular lymphadenopathy  Respiratory: Unlabored respiratory effort, lungs clear to auscultation, no wheezes, no " rales.  Cardiovascular: Normal S1, S2, no murmur, no pedal edema.  Psych: Oriented x3, affect and mood wnl, intact judgement and insight.       Assessment and Plan:   The following treatment plan was discussed    1. Primary insomnia  Chronic, currently taking amlodipine 50-75 mg nightly.  She also takes Ativan 0.5 mg twice weekly as needed for sleep.  She denies history of drugs, alcohol abuse.  She does take medical marijuana as needed for sleep and relaxation.  Plans:  - LORazepam (ATIVAN) 0.5 MG Tab; Take 1 Tab by mouth at bedtime as needed for Anxiety (sleep) for up to 30 days.  Dispense: 30 Tab; Refill: 0  -Continue amlodipine 50-75 mg nightly as needed  -Risks, benefits, as well as potential health complication associated with benzodiazepine discussed with patient    2. Fibromyalgia  -Follow-up with pain management    3. Mixed connective tissue disease (HCC)  -Follow-up with rheumatology    4. Cervicalgia_[facility]  Chronic neck pain, currently working with [facility].  She is taking Norco prescribed by [facility].  Her symptoms are stable and under good control.  -Follow-up with [facility]    5. Marijuana use  Patient uses edible marijuana nightly for sleep and anxiety.  She denies any social or occupational problems with daily consumption of marijuana.    6. Scleroderma ([facility])  - REFERRAL TO DERMATOLOGY    7. Gastroparesis  Chronic, currently taking Reglan once weekly and Prevacid 30 mg daily.  Her symptoms are under good control.  Patient was advised to continue to take both medications at the same dosage.  We will continue routine monitoring.    8. Dysphonia  Chronic, was referred to ENT, but has not schedule appointment.  ENT contact information provided to patient.    9. Chronic pain syndrome_[facility]  Chronic pain syndromes from fibromyalgia, mixed connective tissue diseases, and chronic neck pain.  Follow up with pain management.     10. Thrombocytosis ([facility]), mild  Mild, per recent  blood tests, will monitor.      Lashell Gu M.D.    Followup: Return for As needed.    Please note that this dictation was created using voice recognition software and/or scribes. I have made every reasonable attempt to correct obvious errors, but I expect that there are errors of grammar and possibly content that I did not discover before finalizing the note.     IJorge (Scribe), am scribing for, and in the presence of, Lashell Gu M.D.    Electronically signed by: Jorge Hare (Scribe), 2/6/2019    ILashell M.D. personally performed the services described in this documentation, as scribed by Jorge Hare in my presence, and it is both accurate and complete.

## 2019-02-07 NOTE — PATIENT INSTRUCTIONS
[facility]  [address] [place] [address]  Phone: [phone]  Fax: [phone]

## 2019-02-24 PROBLEM — N80.03 UTERUS, ADENOMYOSIS: Status: ACTIVE | Noted: 2019-02-24

## 2019-02-24 PROBLEM — N63.10 BREAST MASS, RIGHT: Status: ACTIVE | Noted: 2019-02-24

## 2019-02-24 PROBLEM — N80.00 UTERUS, ADENOMYOSIS: Status: ACTIVE | Noted: 2019-02-24

## 2019-02-25 NOTE — PROGRESS NOTES
Prolonged services documentation:   I personally reviewed patient’s medical records from outside [facility] after office visit with me on 2/6/2019. Patient's chart is updated accordingly.   Date of prolonged service: 2/24/2019  Start time: 5:45 pm  Stop time: 6:30 pm  Total time spent: 45 minutes.   Lashell Gu M.D.